# Patient Record
Sex: MALE | Race: WHITE | HISPANIC OR LATINO | Employment: STUDENT | URBAN - METROPOLITAN AREA
[De-identification: names, ages, dates, MRNs, and addresses within clinical notes are randomized per-mention and may not be internally consistent; named-entity substitution may affect disease eponyms.]

---
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
## 2024-05-06 ENCOUNTER — OFFICE VISIT (OUTPATIENT)
Dept: URGENT CARE | Facility: MEDICAL CENTER | Age: 19
End: 2024-05-06
Payer: COMMERCIAL

## 2024-05-06 VITALS
OXYGEN SATURATION: 98 % | WEIGHT: 179.8 LBS | DIASTOLIC BLOOD PRESSURE: 66 MMHG | RESPIRATION RATE: 18 BRPM | HEIGHT: 69 IN | SYSTOLIC BLOOD PRESSURE: 115 MMHG | HEART RATE: 69 BPM | BODY MASS INDEX: 26.63 KG/M2 | TEMPERATURE: 97.6 F

## 2024-05-06 DIAGNOSIS — R53.83 OTHER FATIGUE: Primary | ICD-10-CM

## 2024-05-06 DIAGNOSIS — R59.1 LYMPHADENOPATHY: ICD-10-CM

## 2024-05-06 LAB
S PYO AG THROAT QL: NEGATIVE
VALID CONTROL: NORMAL

## 2024-05-06 PROCEDURE — 87070 CULTURE OTHR SPECIMN AEROBIC: CPT | Performed by: FAMILY MEDICINE

## 2024-05-06 PROCEDURE — 99213 OFFICE O/P EST LOW 20 MIN: CPT | Performed by: FAMILY MEDICINE

## 2024-05-06 PROCEDURE — 87147 CULTURE TYPE IMMUNOLOGIC: CPT | Performed by: FAMILY MEDICINE

## 2024-05-07 NOTE — PATIENT INSTRUCTIONS
Rapid strep test-negative.  Throat culture sent.  Since patient had symptoms of fatigue 3 days ago, I ordered Monospot and advised patient to have blood drawn first thing in the morning.  However if     Lymphadenopathy   WHAT YOU NEED TO KNOW:   Lymphadenopathy is swelling of your lymph nodes. Lymph nodes are small organs that are part of your immune system. The lymph nodes are found throughout your body. They are most easily felt in your neck, under your arms, and near your groin. Lymphadenopathy can occur in one or more areas of your body. It is usually caused by an infection.  DISCHARGE INSTRUCTIONS:   Return to the emergency department if:   The swollen lymph nodes bleed.    You have swollen lymph nodes in your neck that affect your breathing or swallowing.    Contact your healthcare provider if:   You have a fever.    You have a new swollen and painful lymph node.    You have a skin rash.    Your lymph node remains swollen or painful, or it gets bigger.    Your lymph node has red streaks around it, or the skin around the lymph node is red.    You have questions or concerns about your condition or care.    Follow up with your doctor as directed:  Write down your questions so you remember to ask them during your visits.  Self-care:   Do not poke or squeeze  the swollen lymph nodes.    Apply heat to the swollen glands.  You may use warm compresses, or an electric heating pad set on low.     Rest as needed.  If you have a fever, rest until your temperature returns to normal. Return to your normal daily activities slowly after your fever is gone.    © Copyright [employer] 2023 Information is for End User's use only and may not be sold, redistributed or otherwise used for commercial purposes.  The above information is an  only. It is not intended as medical advice for individual conditions or treatments. Talk to your doctor, nurse or pharmacist before following any medical regimen to see if it is safe  and effective for you.  he develops any difficulty swallowing or breathing tonight, he is to go immediately to the emergency room.  He expressed understanding.

## 2024-05-07 NOTE — PROGRESS NOTES
[facility]        NAME: Rayo Nur is a 18 y.o. male  : 2005    MRN: [id number]  DATE: May 6, 2024  TIME: 8:50 PM    Assessment and Plan   Other fatigue [R53.83]  1. Other fatigue  POCT rapid ANTIGEN strepA      2. Lymphadenopathy  POCT rapid ANTIGEN strepA            Patient Instructions       Follow up with PCP in 3-5 days.  Proceed to  ER if symptoms worsen.    If tests have been performed at [facility], our office will contact you with results if changes need to be made to the care plan discussed with you at the visit.  You can review your full results on [facility].    Chief Complaint     Chief Complaint   Patient presents with    Mass     Bump is on right side of neck and appeared Saturday evening. Tonsils are also inflamed. No fevers or sore throat. No OTC medications have been taken. Nothing makes bump hurt, just tight sometimes.         History of Present Illness       18-year-old male here today with complaint of lump appearing on the right side of his neck about 3 days ago.  Other than that, he has been asymptomatic.  No complaints of fever or sore throat.  Use complaints of fatigue since 3 days ago.  He has noticed since the lump has grown in size stenosis some pressure around his neck.  Denies difficulty breathing.  He informs his mother looked in his mouth and noticed that one of his tonsils are enlarged and she believes there was pus in it.  Denies any sick contacts.        Review of Systems   Review of Systems   Constitutional:  Positive for fatigue.   HENT: Negative.     Respiratory: Negative.     Skin: Negative.    Neurological: Negative.          Current Medications       Current Outpatient Medications:     fluticasone (Flovent HFA) 220 mcg/act inhaler, Spray 2 puffs to the back of throat and SWALLOW twice a day - nothing to eat or drink for 30 minutes then rinse mouth (Patient not taking: Reported on 2023), Disp: 12 g, Rfl: 5    pantoprazole (PROTONIX) 20 mg  tablet, Take 1 tablet (20 mg total) by mouth daily (Patient not taking: Reported on 10/31/2023), Disp: 90 tablet, Rfl: 2    senna (SENOKOT) 8.6 mg, Take 2 tablets (17.2 mg total) by mouth daily at bedtime (Patient not taking: Reported on 9/12/2022), Disp: 60 tablet, Rfl: 2    Current Allergies     Allergies as of 05/06/2024    (Not on File)            The following portions of the patient's history were reviewed and updated as appropriate: allergies, current medications, past family history, past medical history, past social history, past surgical history and problem list.     Past Medical History:   Diagnosis Date    Buckle fracture of distal ends of radius and ulna     last assessed 9/11/15 right    Closed Colles' fracture     left wrist,last assessed 12/6/13    Dermatitis, seborrheic 11/22/2017    Eczema 12/7/2016    Esophagitis     Fracture of phalanx of right thumb     closed, initial encounter    G6PD deficiency     Shoulder pain     Right    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    Wrist fracture 2012    Left buckle fracture. And 10/30/2013       Past Surgical History:   Procedure Laterality Date    CIRCUMCISION      EGD  09/2021    Dr Jamie Sanders.  Upper third esophagus linear furrows, no dilatation, biopsies negative for H. pylori, negative celiac, positive EOE.    EGD  03/2022    Dr Jamie Sanders, peds gi; distal eoe ?gerd related. Upper third esophagus linear furrows, no dilatation.    EGD  10/2022    Dr. Khan.  Exam normal, biopsy of upper third esophagus negative for EOE, positive for reflux esophagitis.  Empirically dilated entire esophagus with 52 [place], then 60 [place].  Recommended speech eval if dysphagia continues.  Dysphagia improves for 2 months after EGD.    NO PAST SURGERIES         Family History   Problem Relation Age of Onset    Endometriosis Mother     Polycystic ovary syndrome Mother     Fibromyalgia Mother     No Known Problems Father     No Known Problems Sister     No Known  "Problems Sister     No Known Problems Brother     Hyperlipidemia Maternal Grandmother     Other Maternal Aunt         esophageal narrowing at birth, did not survive the surgery.    Diabetes Family     Hyperlipidemia Family     Osteoporosis Family          Medications have been verified.        Objective   /66   Pulse 69   Temp 97.6 °F (36.4 °C) (Tympanic)   Resp 18   Ht 5' 9\" (1.753 m)   Wt 81.6 kg (179 lb 12.8 oz)   SpO2 98%   BMI 26.55 kg/m²   No LMP for male patient.       Physical Exam     Physical Exam  Nursing note reviewed.   Constitutional:       Appearance: Normal appearance.   HENT:      Mouth/Throat:      Pharynx: Oropharyngeal exudate present.      Comments: Markedly hypertrophic tonsils, 3+ right side; 2+ left side.  Exudate right tonsil.  Otherwise posterior pharynx is unremarkable.  Neck:      Comments: Markedly enlarged submandibular lymph node slightly tender to touch.  Patient also has a right anterior lymph node measuring about 3 to 4 cm fluctuant not firm slightly tender.  Pulmonary:      Effort: Pulmonary effort is normal.      Breath sounds: Normal breath sounds.   Lymphadenopathy:      Cervical: Cervical adenopathy present.                   "

## 2024-05-09 ENCOUNTER — TELEPHONE (OUTPATIENT)
Dept: URGENT CARE | Facility: MEDICAL CENTER | Age: 19
End: 2024-05-09

## 2024-05-09 DIAGNOSIS — J02.0 STREP THROAT: Primary | ICD-10-CM

## 2024-05-09 LAB — BACTERIA THROAT CULT: ABNORMAL

## 2024-05-09 RX ORDER — AMOXICILLIN 875 MG/1
875 TABLET, COATED ORAL 2 TIMES DAILY
Qty: 20 TABLET | Refills: 0 | Status: SHIPPED | OUTPATIENT
Start: 2024-05-09 | End: 2024-05-19

## 2024-05-09 NOTE — TELEPHONE ENCOUNTER
Called and left voicemail with message for patient regarding throat culture and also inquiring about lymphadenopathy with right neck area.  I ordered Monospot test which she has not performed yet.

## 2024-05-09 NOTE — TELEPHONE ENCOUNTER
Spoke with the patient regarding throat culture result and current complaints.  He is still having sore throat but denies any fever.  He also informs me that right lymph node has remained the same size.  Has not increased in size.  He is currently taking no pain medication.  He informs that he has also not undergone Monospot test at this time.  I explained to him that if he is still having symptoms I will call in prescription for amoxicillin 875 twice a day for 7 days which he uses started immediately.  He expressed understanding.  Also advised him to have blood drawn for Monospot to rule out mononucleosis.

## 2025-07-10 ENCOUNTER — OFFICE VISIT (OUTPATIENT)
Dept: OTOLARYNGOLOGY | Facility: CLINIC | Age: 20
End: 2025-07-10

## 2025-07-10 VITALS — WEIGHT: 174.6 LBS | BODY MASS INDEX: 25.78 KG/M2

## 2025-07-10 DIAGNOSIS — J03.91 RECURRENT TONSILLITIS: ICD-10-CM

## 2025-07-10 DIAGNOSIS — R13.19 OTHER DYSPHAGIA: Primary | ICD-10-CM

## 2025-07-10 DIAGNOSIS — K21.00 GASTROESOPHAGEAL REFLUX DISEASE WITH ESOPHAGITIS WITHOUT HEMORRHAGE: ICD-10-CM

## 2025-07-10 DIAGNOSIS — K21.9 LARYNGOPHARYNGEAL REFLUX (LPR): ICD-10-CM

## 2025-07-10 DIAGNOSIS — J35.1 HYPERTROPHY OF TONSILS: ICD-10-CM

## 2025-07-10 RX ORDER — PANTOPRAZOLE SODIUM 40 MG/1
40 TABLET, DELAYED RELEASE ORAL DAILY
Qty: 30 TABLET | Refills: 2 | Status: SHIPPED | OUTPATIENT
Start: 2025-07-10

## 2025-07-10 NOTE — PROGRESS NOTES
[facility]  Rayo Nur 19 y.o. male MRN: [id number]  Encounter: [id number]  Esequiel Abdullahi MD  Office : [phone]  Also available on [employer]    Thank you for referring Rayo Nur for an evaluation. My recommendations are included. Please do not hesitate to contact me with any questions you may have.       ASSESSMENT AND PLAN:      No diagnosis found.    Patient with a very prominent chronic dysphagia, he does have significant esophageal issues as a matter fact has had 3 EGDs and he is 19 years old.  The initial EGD did show some changes suggesting reflux.  Currently tonsils are 2+ despite the significant dysphagia that he reports.  I suspect that most of his symptoms are secondary to silent reflux.  As [id number] currently the tonsils are not the source of dysphagia, the number of tonsillitis is relatively long only 1 episode per year.  I recommend starting reflux treatment including diet that has not been done before.  After 2 or 3 months trial make a decision regarding tonsillectomy.    ______________________________________________________________________    Reason for consultation : recurrent tonsillitis,     HPI: Rayo Nur is a 19 y.o. male has significant enlargement of tonsils, in addition has been having tonsillitis once a year.  During his tonsillitis his odynophagia and dysphagia are very significant.  In addition patient has been evaluated with video barium swallow and EGD due to additional dysphagia issues.  As a matter of fact, he has had esophageal dilations at least twice last esophageal dilation was done in 2022.  This resulted in improvement of the swallowing problem.  At that point he was prescribed pantoprazole that he took for a period of time without any subjective change in symptoms.  Patient reports that he has never felt heartburn.   Close review of the chart reveals that he has had at least 3 EGDs in the last 5 years.   When  "interrogated he does not usually have throat clearing, dysphonia or sensation of phlegm 2+.  He does have more throat clearing when he is \"needing a dilation \".    PRIOR VISIT, ENDOSCOPIC EVALUATION :     REVIEW OF SYSTEMS:    Review of systems:  10 Point ROS was performed and negative except as above or otherwise noted in the medical record.    Historical Information   Past Medical History[1]  Past Surgical History[2]  Social History   Social History     Substance and Sexual Activity   Alcohol Use No     Social History     Substance and Sexual Activity   Drug Use No     Tobacco Use History[3]  Family History[4]    Meds/Allergies     Current Medications[5]    Allergies[6]      PHYSICAL EXAM:    Weight 79.2 kg (174 lb 9.6 oz). Body mass index is 25.78 kg/m².  Constitutional: Oriented to person, place, and time. Well-developed and well-nourished, no apparent distress, non-toxic appearance. Cooperative, able to hear and answer questions without difficulty.    Voice: Normal voice quality.  Head: Normocephalic, atraumatic.  No scars, masses or lesions.  Face: Symmetric, no edema, no sinus tenderness.  Eyes: Vision grossly intact, extra-ocular movement intact.  Right Ear: External ear normal.  Auditory canal clear.  Tympanic membrane well-appearing, without retraction or scarring.  No fluid present. No post-auricular erythema or tenderness  Left Ear: External ear normal.  Auditory canal clear.  Tympanic membrane well-appearing, without retraction or scarring.  No fluid present.  No post-auricular erythema or tenderness.  Nose: Septum midline, nares clear.  Mucosa moist, turbinates well appearing.  No crusting, polyps or discharge evident.  Oral cavity: Dentition intact.  Mucosa moist, lips normal.  Tongue mobile, floor of mouth normal.  Hard palate unremarkable.  No masses or lesions.   Oropharynx: Uvula is midline, soft palate normal.  Unremarkable oropharyngeal inlet.  Tonsils unremarkable.  Posterior pharyngeal wall " clear. No masses or lesions.  Salivary glands:  Parotid glands and submandibular glands symmetric, no enlargement or tenderness.  Neck: Normal laryngeal elevation with swallow.  Trachea midline.  No masses or lesions. No palpable adenopathy.  Thyroid: normal in size, unremarkable without tenderness or palpable nodules.  Pulmonary/Chest: Normal effort and rate. No respiratory distress.   Musculoskeletal: Normal range of motion.   Neurological: Cranial nerves 2-12 intact.  Skin: Skin is warm and dry.   Psychiatric: Normal mood and affect.    Procedure: Flexible fiberoptic laryngoscopy     Indications: Evaluate areas of the upper aerodigestive tract not seen on physical exam     Procedure in detail: After informed verbal consent was obtained the nose was anesthetized using 4% lidocaine and neosynephrine spray. After adequate time for anesthesia the scope was guided easily along the nasal cavity floor and into the nasopharynx. The nasopharynx, oropharynx, hypopharynx and larynx were evaluated with the below listed findings  Endoscope advanced through left nasal cavity, no discharge on middle meatus, no discharge from sphenoethmoid recess, adenoids are 2+, no exudate or discharge present.  The endoscope is then advanced to the hypopharynx, enlarged tonsils are noticed.  Lingual tonsil is also noticed to be significantly enlarged abutting the epiglottis.  Larynx has normal anatomy, true vocal cords are mobile symmetrically have good closure on phonation, no mucosal lesions or mass noticed on the vocal cords.  No evident erythema of the arytenoids but he does have significant postcricoid edema.  Piriform sinuses are clear. The scope was removed without difficulty and the patient tolerated the procedure well.     EKG, Pathology, and   Other Studies: I have personally reviewed pertinent reports and   Biopsies from EGD 9/2021, 3/20/2022 and 10/20/2022 revealed the presence of increased eosinophils on the distal esophagus, on  the first biopsy there is also regenerative epithelial hyperplasia suggesting reflux.  None of the biopsies has shown metaplasia or dysplasia.         [1]   Past Medical History:  Diagnosis Date    Buckle fracture of distal ends of radius and ulna     last assessed 9/11/15 right    Closed Colles' fracture     left wrist,last assessed 12/6/13    Dermatitis, seborrheic 11/22/2017    Eczema 12/7/2016    Esophagitis     Fracture of phalanx of right thumb     closed, initial encounter    G6PD deficiency     Shoulder pain     Right    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    Wrist fracture 2012    Left buckle fracture. And 10/30/2013   [2]   Past Surgical History:  Procedure Laterality Date    CIRCUMCISION      EGD  09/2021    Dr Jamie Sanders.  Upper third esophagus linear furrows, no dilatation, biopsies negative for H. pylori, negative celiac, positive EOE.    EGD  03/2022    Dr Jamie Sanders, peds gi; distal eoe ?gerd related. Upper third esophagus linear furrows, no dilatation.    EGD  10/2022    Dr. Khan.  Exam normal, biopsy of upper third esophagus negative for EOE, positive for reflux esophagitis.  Empirically dilated entire esophagus with 52 Vincentian, then 60 Vincentian.  Recommended speech eval if dysphagia continues.  Dysphagia improves for 2 months after EGD.    NO PAST SURGERIES     [3]   Social History  Tobacco Use   Smoking Status Never   Smokeless Tobacco Never   [4]   Family History  Problem Relation Name Age of Onset    Endometriosis Mother      Polycystic ovary syndrome Mother      Fibromyalgia Mother      No Known Problems Father      No Known Problems Sister      No Known Problems Sister half     No Known Problems Brother half     Hyperlipidemia Maternal Grandmother      Other Maternal Aunt          esophageal narrowing at birth, did not survive the surgery.    Diabetes Family      Hyperlipidemia Family      Osteoporosis Family     [5] No current outpatient medications on file.  [6] No Known  Allergies